# Patient Record
Sex: MALE | Race: WHITE | NOT HISPANIC OR LATINO | Employment: FULL TIME
[De-identification: names, ages, dates, MRNs, and addresses within clinical notes are randomized per-mention and may not be internally consistent; named-entity substitution may affect disease eponyms.]

---

## 2018-07-28 ENCOUNTER — RECORDS - HEALTHEAST (OUTPATIENT)
Dept: ADMINISTRATIVE | Facility: OTHER | Age: 53
End: 2018-07-28

## 2018-10-09 ENCOUNTER — RECORDS - HEALTHEAST (OUTPATIENT)
Dept: LAB | Facility: CLINIC | Age: 53
End: 2018-10-09

## 2018-10-09 LAB
ALBUMIN SERPL-MCNC: 3.9 G/DL (ref 3.5–5)
ANION GAP SERPL CALCULATED.3IONS-SCNC: 9 MMOL/L (ref 5–18)
BUN SERPL-MCNC: 14 MG/DL (ref 8–22)
CALCIUM SERPL-MCNC: 9.7 MG/DL (ref 8.5–10.5)
CHLORIDE BLD-SCNC: 102 MMOL/L (ref 98–107)
CO2 SERPL-SCNC: 27 MMOL/L (ref 22–31)
CREAT SERPL-MCNC: 0.73 MG/DL (ref 0.7–1.3)
GFR SERPL CREATININE-BSD FRML MDRD: >60 ML/MIN/1.73M2
GLUCOSE BLD-MCNC: 76 MG/DL (ref 70–125)
PHOSPHATE SERPL-MCNC: 3.6 MG/DL (ref 2.5–4.5)
POTASSIUM BLD-SCNC: 4.8 MMOL/L (ref 3.5–5)
SODIUM SERPL-SCNC: 138 MMOL/L (ref 136–145)

## 2018-10-25 ENCOUNTER — RECORDS - HEALTHEAST (OUTPATIENT)
Dept: ADMINISTRATIVE | Facility: OTHER | Age: 53
End: 2018-10-25

## 2019-06-17 ENCOUNTER — AMBULATORY - HEALTHEAST (OUTPATIENT)
Dept: PHYSICAL MEDICINE AND REHAB | Facility: CLINIC | Age: 54
End: 2019-06-17

## 2019-06-17 DIAGNOSIS — S39.012D LUMBAR SPINE STRAIN, SUBSEQUENT ENCOUNTER: ICD-10-CM

## 2019-06-21 ENCOUNTER — HOSPITAL ENCOUNTER (OUTPATIENT)
Dept: PHYSICAL MEDICINE AND REHAB | Facility: CLINIC | Age: 54
Discharge: HOME | End: 2019-06-21
Attending: FAMILY MEDICINE

## 2019-06-21 DIAGNOSIS — G89.29 CHRONIC RIGHT-SIDED LOW BACK PAIN WITH RIGHT-SIDED SCIATICA: ICD-10-CM

## 2019-06-21 DIAGNOSIS — M79.18 RIGHT BUTTOCK PAIN: ICD-10-CM

## 2019-06-21 DIAGNOSIS — M54.41 CHRONIC RIGHT-SIDED LOW BACK PAIN WITH RIGHT-SIDED SCIATICA: ICD-10-CM

## 2019-06-21 RX ORDER — TADALAFIL 5 MG/1
5 TABLET ORAL DAILY PRN
Status: SHIPPED | COMMUNITY
Start: 2019-06-21

## 2019-07-01 ENCOUNTER — HOSPITAL ENCOUNTER (OUTPATIENT)
Dept: MRI IMAGING | Facility: HOSPITAL | Age: 54
Discharge: HOME | End: 2019-07-01

## 2019-07-01 ENCOUNTER — HOSPITAL ENCOUNTER (OUTPATIENT)
Dept: RADIOLOGY | Facility: HOSPITAL | Age: 54
Discharge: HOME | End: 2019-07-01

## 2019-07-01 DIAGNOSIS — M54.41 CHRONIC RIGHT-SIDED LOW BACK PAIN WITH RIGHT-SIDED SCIATICA: ICD-10-CM

## 2019-07-01 DIAGNOSIS — G89.29 CHRONIC RIGHT-SIDED LOW BACK PAIN WITH RIGHT-SIDED SCIATICA: ICD-10-CM

## 2019-07-01 DIAGNOSIS — M79.18 RIGHT BUTTOCK PAIN: ICD-10-CM

## 2019-07-03 ENCOUNTER — COMMUNICATION - HEALTHEAST (OUTPATIENT)
Dept: PHYSICAL MEDICINE AND REHAB | Facility: CLINIC | Age: 54
End: 2019-07-03

## 2019-07-12 ENCOUNTER — HOSPITAL ENCOUNTER (OUTPATIENT)
Dept: PHYSICAL MEDICINE AND REHAB | Facility: CLINIC | Age: 54
Discharge: HOME | End: 2019-07-12
Attending: NURSE PRACTITIONER

## 2019-07-12 DIAGNOSIS — M47.816 LUMBAR FACET ARTHROPATHY: ICD-10-CM

## 2019-07-12 DIAGNOSIS — M79.18 RIGHT BUTTOCK PAIN: ICD-10-CM

## 2019-07-12 DIAGNOSIS — G89.29 CHRONIC RIGHT-SIDED LOW BACK PAIN WITHOUT SCIATICA: ICD-10-CM

## 2019-07-12 DIAGNOSIS — M54.50 CHRONIC RIGHT-SIDED LOW BACK PAIN WITHOUT SCIATICA: ICD-10-CM

## 2019-07-12 DIAGNOSIS — M51.369 DDD (DEGENERATIVE DISC DISEASE), LUMBAR: ICD-10-CM

## 2019-07-24 ENCOUNTER — RECORDS - HEALTHEAST (OUTPATIENT)
Dept: ADMINISTRATIVE | Facility: OTHER | Age: 54
End: 2019-07-24

## 2019-07-25 ENCOUNTER — RECORDS - HEALTHEAST (OUTPATIENT)
Dept: RADIOLOGY | Facility: CLINIC | Age: 54
End: 2019-07-25

## 2019-08-02 ENCOUNTER — HOSPITAL ENCOUNTER (OUTPATIENT)
Dept: PHYSICAL MEDICINE AND REHAB | Facility: CLINIC | Age: 54
Discharge: HOME | End: 2019-08-02
Attending: PAIN MEDICINE

## 2019-08-02 DIAGNOSIS — G89.29 CHRONIC RIGHT-SIDED LOW BACK PAIN WITHOUT SCIATICA: ICD-10-CM

## 2019-08-02 DIAGNOSIS — M47.816 LUMBAR FACET ARTHROPATHY: ICD-10-CM

## 2019-08-02 DIAGNOSIS — M54.50 CHRONIC RIGHT-SIDED LOW BACK PAIN WITHOUT SCIATICA: ICD-10-CM

## 2019-08-02 DIAGNOSIS — M79.18 RIGHT BUTTOCK PAIN: ICD-10-CM

## 2019-08-26 ENCOUNTER — HOSPITAL ENCOUNTER (OUTPATIENT)
Dept: PHYSICAL MEDICINE AND REHAB | Facility: CLINIC | Age: 54
Discharge: HOME | End: 2019-08-26
Attending: NURSE PRACTITIONER

## 2019-08-26 DIAGNOSIS — M54.50 CHRONIC RIGHT-SIDED LOW BACK PAIN WITHOUT SCIATICA: ICD-10-CM

## 2019-08-26 DIAGNOSIS — G89.29 CHRONIC RIGHT-SIDED LOW BACK PAIN WITHOUT SCIATICA: ICD-10-CM

## 2019-08-26 DIAGNOSIS — M47.816 LUMBAR FACET ARTHROPATHY: ICD-10-CM

## 2019-08-26 DIAGNOSIS — M79.18 RIGHT BUTTOCK PAIN: ICD-10-CM

## 2019-08-26 DIAGNOSIS — M51.369 DDD (DEGENERATIVE DISC DISEASE), LUMBAR: ICD-10-CM

## 2019-11-11 ENCOUNTER — COMMUNICATION - HEALTHEAST (OUTPATIENT)
Dept: PHYSICAL MEDICINE AND REHAB | Facility: CLINIC | Age: 54
End: 2019-11-11

## 2019-11-12 ENCOUNTER — AMBULATORY - HEALTHEAST (OUTPATIENT)
Dept: PHYSICAL MEDICINE AND REHAB | Facility: CLINIC | Age: 54
End: 2019-11-12

## 2019-11-12 DIAGNOSIS — M53.3 SACROILIAC JOINT DYSFUNCTION: ICD-10-CM

## 2019-11-12 DIAGNOSIS — M79.18 RIGHT BUTTOCK PAIN: ICD-10-CM

## 2019-11-12 DIAGNOSIS — G89.29 CHRONIC RIGHT-SIDED LOW BACK PAIN WITHOUT SCIATICA: ICD-10-CM

## 2019-11-12 DIAGNOSIS — M53.3 SACROILIAC JOINT PAIN: ICD-10-CM

## 2019-11-12 DIAGNOSIS — M54.50 CHRONIC RIGHT-SIDED LOW BACK PAIN WITHOUT SCIATICA: ICD-10-CM

## 2019-11-22 ENCOUNTER — HOSPITAL ENCOUNTER (OUTPATIENT)
Dept: PHYSICAL MEDICINE AND REHAB | Facility: CLINIC | Age: 54
Discharge: HOME | End: 2019-11-22
Attending: PAIN MEDICINE

## 2019-11-22 DIAGNOSIS — M53.3 SACROILIAC JOINT DYSFUNCTION: ICD-10-CM

## 2019-11-22 DIAGNOSIS — G89.29 CHRONIC RIGHT-SIDED LOW BACK PAIN WITHOUT SCIATICA: ICD-10-CM

## 2019-11-22 DIAGNOSIS — M79.18 RIGHT BUTTOCK PAIN: ICD-10-CM

## 2019-11-22 DIAGNOSIS — M54.50 CHRONIC RIGHT-SIDED LOW BACK PAIN WITHOUT SCIATICA: ICD-10-CM

## 2019-11-22 DIAGNOSIS — M53.3 SACROILIAC JOINT PAIN: ICD-10-CM

## 2019-12-06 ENCOUNTER — HOSPITAL ENCOUNTER (OUTPATIENT)
Dept: PHYSICAL MEDICINE AND REHAB | Facility: CLINIC | Age: 54
Discharge: HOME | End: 2019-12-06
Attending: NURSE PRACTITIONER

## 2019-12-06 DIAGNOSIS — M54.50 CHRONIC RIGHT-SIDED LOW BACK PAIN WITHOUT SCIATICA: ICD-10-CM

## 2019-12-06 DIAGNOSIS — M53.3 SACROILIAC JOINT PAIN: ICD-10-CM

## 2019-12-06 DIAGNOSIS — M53.3 SACROILIAC JOINT DYSFUNCTION: ICD-10-CM

## 2019-12-06 DIAGNOSIS — M47.816 LUMBAR FACET ARTHROPATHY: ICD-10-CM

## 2019-12-06 DIAGNOSIS — M51.369 DDD (DEGENERATIVE DISC DISEASE), LUMBAR: ICD-10-CM

## 2019-12-06 DIAGNOSIS — M79.18 RIGHT BUTTOCK PAIN: ICD-10-CM

## 2019-12-06 DIAGNOSIS — G89.29 CHRONIC RIGHT-SIDED LOW BACK PAIN WITHOUT SCIATICA: ICD-10-CM

## 2019-12-20 ENCOUNTER — HOSPITAL ENCOUNTER (OUTPATIENT)
Dept: PHYSICAL MEDICINE AND REHAB | Facility: CLINIC | Age: 54
Discharge: HOME | End: 2019-12-20
Attending: PAIN MEDICINE

## 2019-12-20 DIAGNOSIS — M54.50 CHRONIC RIGHT-SIDED LOW BACK PAIN WITHOUT SCIATICA: ICD-10-CM

## 2019-12-20 DIAGNOSIS — M47.816 LUMBAR FACET ARTHROPATHY: ICD-10-CM

## 2019-12-20 DIAGNOSIS — G89.29 CHRONIC RIGHT-SIDED LOW BACK PAIN WITHOUT SCIATICA: ICD-10-CM

## 2020-01-02 ENCOUNTER — AMBULATORY - HEALTHEAST (OUTPATIENT)
Dept: PHYSICAL MEDICINE AND REHAB | Facility: CLINIC | Age: 55
End: 2020-01-02

## 2020-01-02 ENCOUNTER — COMMUNICATION - HEALTHEAST (OUTPATIENT)
Dept: PHYSICAL MEDICINE AND REHAB | Facility: CLINIC | Age: 55
End: 2020-01-02

## 2020-01-02 DIAGNOSIS — M47.816 LUMBAR FACET ARTHROPATHY: ICD-10-CM

## 2020-01-02 DIAGNOSIS — M54.50 CHRONIC RIGHT-SIDED LOW BACK PAIN WITHOUT SCIATICA: ICD-10-CM

## 2020-01-02 DIAGNOSIS — M51.369 DDD (DEGENERATIVE DISC DISEASE), LUMBAR: ICD-10-CM

## 2020-01-02 DIAGNOSIS — M79.18 RIGHT BUTTOCK PAIN: ICD-10-CM

## 2020-01-02 DIAGNOSIS — G89.29 CHRONIC RIGHT-SIDED LOW BACK PAIN WITHOUT SCIATICA: ICD-10-CM

## 2020-01-17 ENCOUNTER — AMBULATORY - HEALTHEAST (OUTPATIENT)
Dept: PHYSICAL MEDICINE AND REHAB | Facility: CLINIC | Age: 55
End: 2020-01-17

## 2020-01-17 DIAGNOSIS — M51.369 DDD (DEGENERATIVE DISC DISEASE), LUMBAR: ICD-10-CM

## 2020-01-17 DIAGNOSIS — M47.816 LUMBAR FACET ARTHROPATHY: ICD-10-CM

## 2020-01-17 DIAGNOSIS — M54.50 CHRONIC RIGHT-SIDED LOW BACK PAIN WITHOUT SCIATICA: ICD-10-CM

## 2020-01-17 DIAGNOSIS — G89.29 CHRONIC RIGHT-SIDED LOW BACK PAIN WITHOUT SCIATICA: ICD-10-CM

## 2020-01-22 ENCOUNTER — OFFICE VISIT - HEALTHEAST (OUTPATIENT)
Dept: PHYSICAL THERAPY | Facility: CLINIC | Age: 55
End: 2020-01-22

## 2020-01-22 DIAGNOSIS — M54.50 CHRONIC RIGHT-SIDED LOW BACK PAIN WITHOUT SCIATICA: ICD-10-CM

## 2020-01-22 DIAGNOSIS — G89.29 CHRONIC RIGHT-SIDED LOW BACK PAIN WITHOUT SCIATICA: ICD-10-CM

## 2020-01-24 ENCOUNTER — OFFICE VISIT - HEALTHEAST (OUTPATIENT)
Dept: PHYSICAL THERAPY | Facility: CLINIC | Age: 55
End: 2020-01-24

## 2020-01-24 DIAGNOSIS — G89.29 CHRONIC RIGHT-SIDED LOW BACK PAIN WITHOUT SCIATICA: ICD-10-CM

## 2020-01-24 DIAGNOSIS — M54.50 CHRONIC RIGHT-SIDED LOW BACK PAIN WITHOUT SCIATICA: ICD-10-CM

## 2020-01-27 ENCOUNTER — OFFICE VISIT - HEALTHEAST (OUTPATIENT)
Dept: PHYSICAL THERAPY | Facility: CLINIC | Age: 55
End: 2020-01-27

## 2020-01-27 DIAGNOSIS — M54.50 CHRONIC RIGHT-SIDED LOW BACK PAIN WITHOUT SCIATICA: ICD-10-CM

## 2020-01-27 DIAGNOSIS — G89.29 CHRONIC RIGHT-SIDED LOW BACK PAIN WITHOUT SCIATICA: ICD-10-CM

## 2020-01-29 ENCOUNTER — OFFICE VISIT - HEALTHEAST (OUTPATIENT)
Dept: PHYSICAL THERAPY | Facility: CLINIC | Age: 55
End: 2020-01-29

## 2020-01-29 DIAGNOSIS — M54.50 CHRONIC RIGHT-SIDED LOW BACK PAIN WITHOUT SCIATICA: ICD-10-CM

## 2020-01-29 DIAGNOSIS — G89.29 CHRONIC RIGHT-SIDED LOW BACK PAIN WITHOUT SCIATICA: ICD-10-CM

## 2020-02-07 ENCOUNTER — HOSPITAL ENCOUNTER (OUTPATIENT)
Dept: PHYSICAL MEDICINE AND REHAB | Facility: CLINIC | Age: 55
Discharge: HOME | End: 2020-02-07
Attending: PAIN MEDICINE

## 2020-02-07 DIAGNOSIS — G89.29 CHRONIC RIGHT-SIDED LOW BACK PAIN WITHOUT SCIATICA: ICD-10-CM

## 2020-02-07 DIAGNOSIS — M47.816 LUMBAR FACET ARTHROPATHY: ICD-10-CM

## 2020-02-07 DIAGNOSIS — M51.369 DDD (DEGENERATIVE DISC DISEASE), LUMBAR: ICD-10-CM

## 2020-02-07 DIAGNOSIS — M54.50 CHRONIC RIGHT-SIDED LOW BACK PAIN WITHOUT SCIATICA: ICD-10-CM

## 2020-02-07 RX ORDER — MULTIPLE VITAMINS W/ MINERALS TAB 9MG-400MCG
1 TAB ORAL DAILY
Status: SHIPPED | COMMUNITY
Start: 2020-02-07

## 2020-02-14 ENCOUNTER — AMBULATORY - HEALTHEAST (OUTPATIENT)
Dept: PHYSICAL MEDICINE AND REHAB | Facility: CLINIC | Age: 55
End: 2020-02-14

## 2020-02-14 DIAGNOSIS — G89.29 CHRONIC RIGHT-SIDED LOW BACK PAIN WITHOUT SCIATICA: ICD-10-CM

## 2020-02-14 DIAGNOSIS — M54.50 CHRONIC RIGHT-SIDED LOW BACK PAIN WITHOUT SCIATICA: ICD-10-CM

## 2020-02-14 DIAGNOSIS — M51.369 DDD (DEGENERATIVE DISC DISEASE), LUMBAR: ICD-10-CM

## 2020-02-14 DIAGNOSIS — M47.816 LUMBAR FACET ARTHROPATHY: ICD-10-CM

## 2020-03-04 ENCOUNTER — HOSPITAL ENCOUNTER (OUTPATIENT)
Dept: PHYSICAL MEDICINE AND REHAB | Facility: CLINIC | Age: 55
Discharge: HOME | End: 2020-03-04
Attending: PAIN MEDICINE

## 2020-03-04 DIAGNOSIS — M47.816 LUMBAR FACET ARTHROPATHY: ICD-10-CM

## 2020-03-04 DIAGNOSIS — M54.50 CHRONIC RIGHT-SIDED LOW BACK PAIN WITHOUT SCIATICA: ICD-10-CM

## 2020-03-04 DIAGNOSIS — M51.369 DDD (DEGENERATIVE DISC DISEASE), LUMBAR: ICD-10-CM

## 2020-03-04 DIAGNOSIS — G89.29 CHRONIC RIGHT-SIDED LOW BACK PAIN WITHOUT SCIATICA: ICD-10-CM

## 2020-03-20 ENCOUNTER — HOSPITAL ENCOUNTER (OUTPATIENT)
Dept: PHYSICAL MEDICINE AND REHAB | Facility: CLINIC | Age: 55
Discharge: HOME | End: 2020-03-20
Attending: NURSE PRACTITIONER

## 2020-03-20 DIAGNOSIS — M53.3 SACROILIAC JOINT PAIN: ICD-10-CM

## 2020-03-20 DIAGNOSIS — M53.3 SACROILIAC JOINT DYSFUNCTION: ICD-10-CM

## 2020-03-20 DIAGNOSIS — M47.816 LUMBAR FACET ARTHROPATHY: ICD-10-CM

## 2020-03-20 DIAGNOSIS — M51.369 DDD (DEGENERATIVE DISC DISEASE), LUMBAR: ICD-10-CM

## 2020-03-20 DIAGNOSIS — G89.29 CHRONIC RIGHT-SIDED LOW BACK PAIN WITHOUT SCIATICA: ICD-10-CM

## 2020-03-20 DIAGNOSIS — M79.18 RIGHT BUTTOCK PAIN: ICD-10-CM

## 2020-03-20 DIAGNOSIS — M54.50 CHRONIC RIGHT-SIDED LOW BACK PAIN WITHOUT SCIATICA: ICD-10-CM

## 2021-01-12 ENCOUNTER — HOSPITAL ENCOUNTER (OUTPATIENT)
Dept: PHYSICAL MEDICINE AND REHAB | Facility: CLINIC | Age: 56
Discharge: HOME | End: 2021-01-12
Attending: NURSE PRACTITIONER

## 2021-01-12 DIAGNOSIS — M47.816 LUMBAR FACET ARTHROPATHY: ICD-10-CM

## 2021-01-12 DIAGNOSIS — M54.50 CHRONIC RIGHT-SIDED LOW BACK PAIN WITHOUT SCIATICA: ICD-10-CM

## 2021-01-12 DIAGNOSIS — G89.29 CHRONIC RIGHT-SIDED LOW BACK PAIN WITHOUT SCIATICA: ICD-10-CM

## 2021-01-12 DIAGNOSIS — M51.369 DDD (DEGENERATIVE DISC DISEASE), LUMBAR: ICD-10-CM

## 2021-01-12 RX ORDER — MELOXICAM 7.5 MG/1
7.5 TABLET ORAL 2 TIMES DAILY PRN
Qty: 30 TABLET | Refills: 2 | Status: SHIPPED | OUTPATIENT
Start: 2021-01-12

## 2021-03-10 ENCOUNTER — HOSPITAL ENCOUNTER (OUTPATIENT)
Dept: PHYSICAL MEDICINE AND REHAB | Facility: CLINIC | Age: 56
Discharge: HOME | End: 2021-03-10
Attending: PAIN MEDICINE

## 2021-03-10 DIAGNOSIS — M99.02 THORACIC REGION SOMATIC DYSFUNCTION: ICD-10-CM

## 2021-03-10 DIAGNOSIS — M51.369 DDD (DEGENERATIVE DISC DISEASE), LUMBAR: ICD-10-CM

## 2021-03-10 DIAGNOSIS — M47.817 LUMBOSACRAL SPONDYLOSIS WITHOUT MYELOPATHY: ICD-10-CM

## 2021-03-10 DIAGNOSIS — M99.04 SOMATIC DYSFUNCTION OF SACRAL REGION: ICD-10-CM

## 2021-03-10 DIAGNOSIS — M53.3 SACROILIAC JOINT DYSFUNCTION: ICD-10-CM

## 2021-03-10 DIAGNOSIS — M99.03 SOMATIC DYSFUNCTION OF LUMBAR REGION: ICD-10-CM

## 2021-03-10 DIAGNOSIS — M79.18 MYOFASCIAL PAIN: ICD-10-CM

## 2021-03-10 DIAGNOSIS — M99.05 SOMATIC DYSFUNCTION OF PELVIC REGION: ICD-10-CM

## 2021-03-24 ENCOUNTER — HOSPITAL ENCOUNTER (OUTPATIENT)
Dept: PHYSICAL MEDICINE AND REHAB | Facility: CLINIC | Age: 56
Discharge: HOME | End: 2021-03-24
Attending: PAIN MEDICINE

## 2021-03-24 DIAGNOSIS — M79.18 MYOFASCIAL PAIN: ICD-10-CM

## 2021-03-24 DIAGNOSIS — M99.05 SOMATIC DYSFUNCTION OF PELVIC REGION: ICD-10-CM

## 2021-03-24 DIAGNOSIS — M99.03 SOMATIC DYSFUNCTION OF LUMBAR REGION: ICD-10-CM

## 2021-03-24 DIAGNOSIS — M99.04 SOMATIC DYSFUNCTION OF SACRAL REGION: ICD-10-CM

## 2021-03-24 DIAGNOSIS — M47.817 LUMBOSACRAL SPONDYLOSIS WITHOUT MYELOPATHY: ICD-10-CM

## 2021-03-24 RX ORDER — DIAZEPAM 5 MG
TABLET ORAL
Qty: 2 TABLET | Refills: 0 | Status: SHIPPED | OUTPATIENT
Start: 2021-03-24

## 2021-03-25 ENCOUNTER — COMMUNICATION - HEALTHEAST (OUTPATIENT)
Dept: PHYSICAL MEDICINE AND REHAB | Facility: CLINIC | Age: 56
End: 2021-03-25

## 2021-04-05 ENCOUNTER — HOSPITAL ENCOUNTER (OUTPATIENT)
Dept: PHYSICAL MEDICINE AND REHAB | Facility: CLINIC | Age: 56
Discharge: HOME | End: 2021-04-05
Attending: PAIN MEDICINE

## 2021-04-05 DIAGNOSIS — M47.817 LUMBOSACRAL SPONDYLOSIS WITHOUT MYELOPATHY: ICD-10-CM

## 2021-04-30 ENCOUNTER — HOSPITAL ENCOUNTER (OUTPATIENT)
Dept: PHYSICAL MEDICINE AND REHAB | Facility: CLINIC | Age: 56
Discharge: HOME | End: 2021-04-30
Attending: NURSE PRACTITIONER

## 2021-04-30 DIAGNOSIS — M53.3 SACROILIAC JOINT PAIN: ICD-10-CM

## 2021-04-30 DIAGNOSIS — M47.816 LUMBAR FACET ARTHROPATHY: ICD-10-CM

## 2021-04-30 DIAGNOSIS — G89.29 CHRONIC BILATERAL LOW BACK PAIN WITHOUT SCIATICA: ICD-10-CM

## 2021-04-30 DIAGNOSIS — M54.50 CHRONIC BILATERAL LOW BACK PAIN WITHOUT SCIATICA: ICD-10-CM
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
## 2021-05-29 NOTE — PATIENT INSTRUCTIONS - HE
Detail Level: Detailed
[facility] Radiology Locations    Please call [phone] to schedule your image(s) (select option #1 and then #2). There are 3 different locations, see below. You can do walk-in visits for xray only images if you want.     [facility]  [address] [place] [id number]    [facility]   [address] [address]    [place] [id number] [facility] [id number]    Discussed the importance of core strengthening, ROM, stretching exercises with the patient and how each of these entities is important in decreasing pain.  Explained to the patient that the purpose of physical therapy is to teach the patient a home exercise program.  These exercises need to be performed every day in order to decrease pain and prevent future occurrences of pain.        ~Please call Nurse Navigation line [phone] with any questions or concerns about your treatment plan, if symptoms worsen and you would like to be seen urgently, or if you have problems controlling bladder and bowel function.  ~Follow Up Appointment time slots with Angela Segovia CNP with the [facility], are also available at the [facility] location near [facility] on the first and third THURSDAY afternoons of each month.      
Quality 110: Preventive Care And Screening: Influenza Immunization: Influenza Immunization previously received during influenza season

## 2021-05-30 NOTE — TELEPHONE ENCOUNTER
It would be best to see him in clinic as I would like to do another exam to evaluate if injections would be an option.

## 2021-05-30 NOTE — TELEPHONE ENCOUNTER
Repeating the exam may be helpful as well to identify what we can do going forward now that we have the MRI pictures. There are findings on his MRI that could be a source of pain, however no severe findings. Therefore I think it would be best to discuss in person that way we can review his MRI in specifics after a physical exam, and answer all questions at that time.

## 2021-05-30 NOTE — PATIENT INSTRUCTIONS - HE
Discussed the importance of core strengthening, ROM, stretching exercises with the patient and how each of these entities is important in decreasing pain.  Explained to the patient that the purpose of physical therapy is to teach the patient a home exercise program.  These exercises need to be performed every day in order to decrease pain and prevent future occurrences of pain.        ~Please call Nurse Navigation line [phone] with any questions or concerns about your treatment plan, if symptoms worsen and you would like to be seen urgently, or if you have problems controlling bladder and bowel function.  ~Follow Up Appointment time slots with Angela Segovia CNP with the [facility], are also available at the [facility] location near [facility] on the first and third THURSDAY afternoons of each month.

## 2021-05-30 NOTE — TELEPHONE ENCOUNTER
----- Message from Angela Segovia CNP sent at 7/3/2019  7:41 AM CDT -----  Please call patient and notify him that his lumbar spine MRI shows lumbar scoliosis/curvature and there is mild facet arthropathy multiple levels with mild disc height loss at L2-3, L3-4 on advanced at L4-5.  There is very mild nerve compression at L3-4 and L4-5 bilaterally but no high-grade nerve compression.  No concerning findings and no clear findings as a result of injury.  I would recommend patient continue physical therapy and follows up in clinic if you would like to consider injection to reassess.

## 2021-05-30 NOTE — TELEPHONE ENCOUNTER
Pt returned call. Provider's response given. Pt verbalized understanding. Pt is scheduled to see provider on 7/12 in follow-up.
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
## 2021-05-31 NOTE — PATIENT INSTRUCTIONS - HE
Could consider right sacroiliac joint steroid injection at any time if you would like to try and target right low back and buttock pain.    Discussed the importance of core strengthening, ROM, stretching exercises with the patient and how each of these entities is important in decreasing pain.  Explained to the patient that the purpose of physical therapy is to teach the patient a home exercise program.  These exercises need to be performed every day in order to decrease pain and prevent future occurrences of pain.        ~Please call Nurse Navigation line [phone] with any questions or concerns about your treatment plan, if symptoms worsen and you would like to be seen urgently, or if you have problems controlling bladder and bowel function.  ~Follow Up Appointment time slots with Angela Segovia CNP with the [facility], are also available at the [facility] location near [facility] on the first and third THURSDAY afternoons of each month.

## 2021-05-31 NOTE — PATIENT INSTRUCTIONS - HE
DISCHARGE INSTRUCTIONS    During office hours (8:00 a.m.- 4:30 p.m.) questions or concerns may be answered  by calling Spine Navigation Nurses at  [phone].     If you experience any problems after hours  please call [phone] and you will be connected to [facility].     All Patients:    ? You may experience an increase in your symptoms for the first 2 days (It may take anywhere between 2 days- 2 weeks for the steroid to have maximum effect).    ? You may use ice on the injection site, as frequently as 20 minutes each hour if needed.    ? You may take your pain medicine.    ? You may continue taking your regular medication after your injection. If you have had a Medial Branch Block you may resume pain medication once your pain diary is completed.    ? You may shower. No swimming, tub bath or hot tub for 48 hours.  You may remove your bandaid/bandage as soon as you are home.    ? You may resume light activities, as tolerated.    ? Resume your usual diet as tolerated.    ? It is strongly advised that you do not drive for 1-3 hours post injection.    ? If you have had oral sedation:  Do not drive for 8 hours post injection.      ? If you have had IV sedation:  Do not drive for 24 hours post injection.  Do not operate hazardous machinery or make important personal/business decisions for 24 hours.      POSSIBLE STEROID SIDE EFFECTS (If steroid/cortisone was used for your procedure)    -If you experience these symptoms, it should only last for a short period      Swelling of the legs                Skin redness (flushing)       Mouth (oral) irritation     Blood sugar (glucose) levels              Sweats                      Mood changes    Headache    Sleeplessness         POSSIBLE PROCEDURE SIDE EFFECTS  -Call the Spine Center if you are concerned    Increased Pain             Increased numbness/tingling        Nausea/Vomiting            Bruising/bleeding at site        Redness or swelling                                                 Difficulty walking        Weakness             Fever greater than 100.5    *In the event of a severe headache after an epidural steroid injection that is relieved by lying down, please call the [facility] to speak with a clinical staff member*

## 2021-06-03 VITALS — WEIGHT: 161 LBS

## 2021-06-03 VITALS — WEIGHT: 165 LBS

## 2021-06-03 VITALS — WEIGHT: 164 LBS

## 2021-06-03 NOTE — TELEPHONE ENCOUNTER
"Patient calling as he is wondering about the \"other injection she talked about when I was in a month or so ago.\" Chart reviewed. Patient last seen 8/26 after a right L5-S1 Facet joint injection. His pain in right low back continues. \"Constant annoying pain in the low back and kind of goes down a bit.\" When trying to clarify where it goes with patient, it is into the right buttock. Rates pain 6/10 at worst and 3/10 at best. Right SI joint injection had been discussed at last appointment. Need to see him as it has been 2 and 1/2 months or can he be scheduled for the SI joint injection? Please advise.  "

## 2021-06-03 NOTE — TELEPHONE ENCOUNTER
Pt returned call. Provider's results and recommendations given. Pt stated understanding and would like to proceed. Injection requirements reviewed. Pt hung up upon transferring to .  verbalized they will call pt to schedule.

## 2021-06-03 NOTE — TELEPHONE ENCOUNTER
Reasonable to move forward with right SI joint steroid injection as long as his pain is localizing to the right low back and upper buttock, if his pain has changed since the last he was in clinic than I would like to see him in clinic for reevaluation.

## 2021-06-03 NOTE — PATIENT INSTRUCTIONS - HE
Follow-up visit with Angela Segovia in 2 weeks to discuss injection outcome and determine care plan going forward.       DISCHARGE INSTRUCTIONS    During office hours (8:00 a.m.- 4:00 p.m.) questions or concerns may be answered  by calling [facility] Navigation Nurses at  [phone].  Messages received after hours will be returned the following business day.      In the case of an emergency, please dial 911 or seek assistance at the nearest Emergency Room/Urgent Care facility.     All Patients:    ? You may experience an increase in your symptoms for the first 2 days (It may take anywhere between 2 days- 2 weeks for the steroid to have maximum effect).    ? You may use ice on the injection site, as frequently as 20 minutes each hour if needed.    ? You may take your pain medicine.    ? You may continue taking your regular medication after your injection. If you have had a Medial Branch Block you may resume pain medication once your pain diary is completed.    ? You may shower. No swimming, tub bath or hot tub for 48 hours.  You may remove your bandaid/bandage as soon as you are home.    ? You may resume light activities, as tolerated.    ? Resume your usual diet as tolerated.    ? It is strongly advised that you do not drive for 1-3 hours post injection.    ? If you have had oral sedation:  Do not drive for 8 hours post injection.      ? If you have had IV sedation:  Do not drive for 24 hours post injection.  Do not operate hazardous machinery or make important personal/business decisions for 24 hours.      POSSIBLE STEROID SIDE EFFECTS (If steroid/cortisone was used for your procedure)    -If you experience these symptoms, it should only last for a short period      Swelling of the legs                Skin redness (flushing)       Mouth (oral) irritation     Blood sugar (glucose) levels              Sweats                      Mood changes    Headache    Sleeplessness         POSSIBLE PROCEDURE SIDE  EFFECTS  -Call the Sp[facility] Center if you are concerned    Increased Pain             Increased numbness/tingling        Nausea/Vomiting            Bruising/bleeding at site        Redness or swelling                                                Difficulty walking        Weakness             Fever greater than 100.5    *In the event of a severe headache after an epidural steroid injection that is relieved by lying down, please call the [facility] to speak with a clinical staff member*

## 2021-06-04 VITALS — WEIGHT: 161 LBS

## 2021-06-04 NOTE — TELEPHONE ENCOUNTER
"Patient calling to speak to [facility] regarding what he is to be doing. States he got a call to schedule PT. \"I've done it in the past and it doesn't do anything.\" Chart reviewed. Per message from [facility]: \"Patient did have a positive response therefore is a candidate for moving forward with second medial branch block for moving forward towards RFA.  However as discussed in our last visit he would have to repeat physical therapy prior to moving forward with radiofrequency ablation.  Therefore I did place an order for physical therapy at this time to establish home exercises for long-term spine health, this is a requirement for insurance to cover radiofrequency ablation.  He would need to complete 4 physical therapy sessions before considering an ablation.\"    This information shared with him. He is wondering if he can have the second set of shots (MBBs) and then PT or has to have PT first and then injection. Explained provider would be made aware of questions and then called with how she would like him to proceed. Stated understanding. Per patient, OK to leave a message.    "
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
## 2021-06-04 NOTE — TELEPHONE ENCOUNTER
We can do the second set of medial branch block however no matter what before he would do the radiofrequency ablation he would have to complete 4 sessions of physical therapy therefore I would highly recommend him starting physical therapy as soon as possible or we will still have to wait.  The medial branch block is only a diagnostic test and will not give him any lasting benefit which is why we recommend starting with PT prior to second MBB.

## 2021-06-04 NOTE — PATIENT INSTRUCTIONS - HE

## 2021-06-04 NOTE — PATIENT INSTRUCTIONS - HE
Please complete your pain diary and return the diary to the Spine Center at your earliest convenience.  The [facility] Center will contact you to schedule your next appointment after your pain diary is reviewed by your doctor.  Thank you.    DISCHARGE INSTRUCTIONS    During office hours (8:00 a.m.- 4:30 p.m.) questions or concerns may be answered  by calling  [phone] or spine navigation [phone]. If you experience any problems after hours  please call [phone] and you will be connected to [facility]     All Patients:  ? You may experience an increase in your symptoms for the first 2 days, once the numbing medication wears off.    ? You may resume your regular medication, no pain medication until you have completed your diary    ? You may shower. No swimming, tub bath or hot tub for 24 hours; remove bandage after 4 hours    ? Continue your activities that can cause you pain to test the blocks.                         ? You should not drive for the next 3 to 5 hours (have someone drive you)           POSSIBLE PROCEDURE SIDE EFFECTS  -Call Spine Center if concerned-    Increased Pain  Increased numbness/tingling     Nausea/Vomiting  Bruising/bleeding at site (hematoma)             Swelling at site (edema) Headache  Difficulty walking  Infection        Fever greater than 100.5

## 2021-06-05 NOTE — PATIENT INSTRUCTIONS - HE
Please complete your pain diary and return the diary to the Spine Center at your earliest convenience.  The Sp[facility] Center will contact you to schedule your next appointment after your pain diary is reviewed by your doctor.  Thank you.    DISCHARGE INSTRUCTIONS    During office hours (8:00 a.m.- 4:00 p.m.) questions or concerns may be answered  by calling Spine Center Navigation Nurses at  [phone].  Messages received after hours will be returned the following business day.      In the case of an emergency, please dial 911 or seek assistance at the nearest Emergency Room/Urgent Care facility.     All Patients:  ? You may experience an increase in your symptoms for the first 2 days, once the numbing medication wears off.    ? You may resume your regular medication, no pain medication until you have completed your diary    ? You may shower. No swimming, tub bath or hot tub for 24 hours; remove bandage after 4 hours    ? Continue your activities that can cause you pain to test the blocks.                         ? You should not drive for the next 3 to 5 hours (have someone drive you)           POSSIBLE PROCEDURE SIDE EFFECTS  -Call Spine Center if concerned-    Increased Pain  Increased numbness/tingling     Nausea/Vomiting  Bruising/bleeding at site (hematoma)             Swelling at site (edema) Headache  Difficulty walking  Infection        Fever greater than 100.5

## 2021-06-05 NOTE — TELEPHONE ENCOUNTER
Pt returned call. He is scheduled for his 1st PT session on 1/22/2020. Encouraged him to schedule at least 3 more sessions after his eval. He stated understanding.     Pt inquiring about 2nd set of MBBs. He wonders if he needs to complete 4 sessions of PT prior to the MBBs or if he can schedule the MBBs while he is doing 4 sessions of PT. Will check with provider to see if there is a preference.     Ok to leave a detailed message upon call back.

## 2021-06-05 NOTE — TELEPHONE ENCOUNTER
Call to pt with provider's response. Left detailed message for pt to return call if he would like to schedule 2nd set of MBBs.  Order in place. Injection requirements will need to be reviewed with him.
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
## 2021-06-05 NOTE — PROGRESS NOTES
"[facility] Discharge Summary  Patient Name: Jose Manuel Barron Jr.  Date: 4/13/2020  Referral Diagnosis:   Chronic right-sided low back pain without sciatica   Referring provider: Angela Segovia C*Visit Diagnosis:   1. Chronic right-sided low back pain without sciatica         Goals:  Pt. will demonstrate/verbalize independence in self-management of condition in : 4 weeks  Pt. will be independent with home exercise program in : 4 weeks    Pt will: have SLR phoenix >60 degrees without pain in 4 weeks  Pt will: demonstrates strength per MedX within norms or increased by 30# average in 4 weeks      Patient was seen for 4 visits from 1/22/20 to 1/29/10 with 0 missed appointments.  Pt was feeling increased soreness after exercises and feeling as if this did not \"fix\" his problem.  Pt discontinued on his own against my advice.    Therapy will be discontinued at this time.  The patient will need a new referral to resume.    Thank you for your referral.  Zuleika Chamberlain, DPT  4/13/2020  1:05 PM      [facility] Rehabilitation Daily Progress     Patient Name: Jose Manuel Barron Jr.  Date: 1/29/2020  Date of Initial Evaluation: 1/22/2020  Visit #: 4/12  PTA visit #:  -  Referral Diagnosis:   Chronic right-sided low back pain without sciatica   Referring provider: Angela Segovia C*  Visit Diagnosis:     ICD-10-CM    1. Chronic right-sided low back pain without sciatica M54.5     G89.29        Assessment:       Patient somewhat resistive to exercise, does report feeling more sore after exercises. Did discuss with patient that often with exercise, soreness is a normal and good response. He feels that in the past it has not \"fixed\" the issues, which the patient is focused on. He is schedule to get a medial branch block. Will benefit from continued education on the benefits of exercise.     Pt has reported that he got a prescription to help him quit smoking.  He also reported today that he will likely " "discontinue PT at this point and continue on his own.  I advised him that coming in to use the MedX equipment will likely help him, but he declined at this time.    Goal Status:  Pt. will demonstrate/verbalize independence in self-management of condition in : 4 weeks  Pt. will be independent with home exercise program in : 4 weeks    Pt will: have SLR hpoenix >60 degrees without pain in 4 weeks  Pt will: demonstrates strength per MedX within norms or increased by 30# average in 4 weeks      Plan / Patient Education:     Pt would like to discontinue therapy at this time.  I will hold his chart 30 days as I advised him against this.      Subjective:     Pain Ratin/10    Pt reports he is remodeling a bedroom so he has been doing a lot of lifting/moving/carrying, doing painting and carpet.    Social information:      Objective:     Good abdominal set.    Lumbar MEDX:  Enter Week / Visit #:   Weight (lbs): 91#  Reps (#): 23  Time: 114  ROM (degrees): 0-36    Exercises:  Exercise #1: Treadmill warmup 1.5 mph 4'  Comment #1: Rotary torso 30# started to the left 90 seconds  Exercise #2: Cat/cow stretch 10x   Comment #2: Seated hamstring stretch 45\" phoenix  Exercise #3: LTR X 10  Comment #3: Piriformis Stretch X 30 seconds  Exercise #4: gilbert pose, not added to HEP  Comment #4: 90/90 abdominal march X 10   Exercise #6: Bridge X 10  Comment #6: TA 90/90 march X 10   Exercise #7: reformer leg press X 10 DL       Treatment Today     TREATMENT MINUTES COMMENTS   Evaluation     Self-care/ Home management     Manual therapy     Neuromuscular Re-education     Therapeutic Activity     Therapeutic Exercises 26 See flowsheets   Gait training     Modality__________________                Total 26    Blank areas are intentional and mean the treatment did not include these items.       Zuleika Chamberlain, PT  2020  "
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
## 2021-06-05 NOTE — TELEPHONE ENCOUNTER
He is okay to at least schedule his second medial branch block but we will not be able to even place an order for the ablation until he has completed 4 PT sessions.

## 2021-06-07 NOTE — PATIENT INSTRUCTIONS - HE
Discussed the importance of core strengthening, ROM, stretching exercises with the patient and how each of these entities is important in decreasing pain.  Explained to the patient that the purpose of physical therapy is to teach the patient a home exercise program.  These exercises need to be performed every day in order to decrease pain and prevent future occurrences of pain.        ~Please call Nurse Navigation line [phone] with any questions or concerns about your treatment plan, if symptoms worsen and you would like to be seen urgently, or if you have problems controlling bladder and bowel function.  ~Follow Up Appointment time slots with Angela Segovia CNP with [facility], are also available at the [facility] location near [facility] on the first and third THURSDAY afternoons of each month.
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
## 2021-06-15 NOTE — PROGRESS NOTES
Assessment:     Diagnoses and all orders for this visit:    Lumbosacral spondylosis without myelopathy    Myofascial pain    Sacroiliac joint dysfunction    DDD (degenerative disc disease), lumbar    Somatic dysfunction of sacral region    Somatic dysfunction of lumbar region    Somatic dysfunction of pelvic region    Thoracic region somatic dysfunction       Jose Manuel Barron Jr. is a 55 y.o. y.o. male with past medical history significant for tobacco dependence, erectile dysfunction, left hip replacement 10/2018 who presents today for follow-up regarding low back pain:    -Overall patient's physical exam is reassuring he has normal strength in his lower extremities.  His pain is likely secondary to facet arthropathy.     Plan:     A shared decision making plan was used. The patient's values and choices were respected. Prior medical records from Angela Segovia's last note on 1/12/2021 were reviewed today. The following represents what was discussed and decided upon by the provider and the patient.        -DIAGNOSTIC TESTS: Images were personally reviewed and interpreted.   --Lumbar spine MRI 7/1/2019 shows posterior disc bulge at L3-4 and L4-5 with minimal bilateral foraminal stenosis.  Disc bulge at L5-S1 with no foraminal stenosis.  Disc height loss most advanced at L4-5.  Right convex curvature thoracolumbar junction.  Multilevel facet hypertrophy with arthritis most advanced on the right at L5-S1.    -INTERVENTIONS: Osteopathic ambulation is performed today.  Please see the procedure note below.    -MEDICATIONS: No changes to medications.  -  Discussed side effects of medications and proper use. Patient verbalized understanding.    -PHYSICAL THERAPY: Recommended he do his physical therapy exercises on a daily basis.  Discussed the importance of core strengthening, ROM, stretching exercises with the patient and how each of these entities is important in decreasing pain.  Explained to the patient that the  purpose of physical therapy is to teach the patient a home exercise program.  These exercises need to be performed every day in order to decrease pain and prevent future occurrences of pain.        -PATIENT EDUCATION: We discussed pain management and fashion including physical therapy, medication management, osteopathic manipulation.  We also discussed possibilities of right L4 and L5 medial branch radiofrequency ablation.    -FOLLOW UP: Patient will follow up in 2 weeks for OT.  Advised to contact clinic if symptoms worsen or change.    Subjective:     Jose Manuel Barron Jr. is a 55 y.o. male who presents today for follow-up regarding right-sided low back pain.  Patient reports that he had a time also very good relief from the radiofrequency ablation, however thereafter pain is slowly progressively returned.  Pain is in his right low back and buttock area where it previously was.  His pain today is 2/10 is worst is 8/10 as best as 1/10.  Pain is  worse with being active and improved with doing nothing and relaxing.  He denies any bowel or bladder changes, fevers, chills, unintentional weight loss.  He is currently not taking anything for pain.  He would like to have a treatment of osteopathic manipulation.  He notes that procedure although it did help quite a bit for a long time it was painful.    -Treatment to Date: No prior spinal surgery or spinal injection.  Physical therapy [facility] for neck and back pain x32 sessions 3/3/2018 through 12/26/2018 with minimal benefit.  Physical therapy x4 sessions optimum 1/29/2020 chronic right LBP.     Right L5-S1 facet joint steroid injection 8/2/2019 with some initial, minimal lasting benefit.  Preprocedure pain 3/10, post 1/10.  Right SI joint steroid injection 11/22/2019 with relief x2 days, 10% lasting relief.  Right L4, L5 MBB 12/20/2019 and 2/7/2020.  Right L4, L5 radiofrequency ablation 3/4/2020 with 90% relief chronic right LBP, relief x8 months.  Preprocedure pain 3/10,  post 0/10.         There is no problem list on file for this patient.      Current Outpatient Medications on File Prior to Encounter   Medication Sig Dispense Refill     multivitamin with minerals (THERA-M) 9 mg iron-400 mcg Tab tablet Take 1 tablet by mouth daily.       tadalafil (CIALIS) 5 MG tablet Take 5 mg by mouth daily as needed for erectile dysfunction.       meloxicam (MOBIC) 7.5 MG tablet Take 1 tablet (7.5 mg total) by mouth 2 (two) times a day as needed. 30 tablet 2     No current facility-administered medications on file prior to encounter.        No Known Allergies    No past medical history on file.     Review of Systems  ROS:  Specifically negative for bowel/bladder dysfunction, balance changes, headache, dizziness, foot drop, fevers, chills, appetite changes, nausea/vomiting, unexplained weight loss. Otherwise 13 systems reviewed are negative. Please see the patient's intake questionnaire from today for details.    Reviewed Social, Family, Past Medical and Past Surgical history with patient, no significant changes noted since prior visit.     Objective:     /78 (Patient Site: Right Arm, Patient Position: Sitting, Cuff Size: Adult Large)   Pulse 88   Temp 99.2  F (37.3  C) (Oral)   SpO2 98%     PHYSICAL EXAMINATION:    --CONSTITUTIONAL: Well developed, well nourished, healthy appearing individual.  --PSYCHIATRIC: Appropriate mood and affect. No difficulty interacting due to temper, social withdrawal, or memory issues.  --SKIN: Lumbar region is dry and intact.   --RESPIRATORY: Normal rhythm and effort. No abnormal accessory muscle breathing patterns noted.   --MUSCULOSKELETAL:  Normal lumbar lordosis noted, no lateral shift.  --GROSS MOTOR: Easily arises from a seated position. Gait is non-antalgic  --LUMBAR SPINE:  Inspection reveals no evidence of deformity. Range of motion is moderately limited in lumbar flexion, extension, lateral rotation.  There is palpation of the low back on the right  side. Straight leg raising in the seated position is negative to radicular pain.  Facet loading is positive on the right.  --SACROILIAC JOINT: *One Finger point test positive on the right.  --LOWER EXTREMITY MOTOR TESTING:  Plantar flexion left 5/5, right 5/5   Dorsiflexion left 5/5, right 5/5   Great toe MTP extension left 5/5, right 5/5  Knee flexion left 5/5, right 5/5  Knee extension left 5/5, right 5/5   Hip flexion left 5/5, right 5/5  Hip abduction left 5/5, right 5/5  Hip adduction left 5/5, right 5/5    --NEUROLOGIC:  Sensation to light touch is intact in the bilateral L4, L5, and S1 dermatomes.    RESULTS:   Imaging: Lumbar spine imaging was reviewed today. The images were shown to the patient and the findings were explained using a spine model.    OSTEOPATHIC STRUCTURAL EXAM:  Thoracic: T5-6 was rotated left and side bent right  Lumbar: L4-5 rotated right and left  Pelvis: Superior right pelvic shear  Sacrum: Rotated right    OMT was performed today.  The patient tolerated the procedure well.  Patient was instructed to drink plenty of water.  As discussed that the patient could have increased soreness after the treatment, soreness like after a workout.    OMT:  TREATMENTS IN PARENTHESES  Thoracic: Muscle energy  Lumbar: Articulatory  Pelvis: Muscle energy  Sacrum: Muscle energy

## 2021-06-15 NOTE — PATIENT INSTRUCTIONS - HE
Osteopathic manipulation is performed today.  You could feel a sense of soreness like a workout type soreness.  Recommend he drink plenty water today.    ~Please call Nurse Navigation line [phone] with any questions or concerns about your treatment plan, if symptoms worsen and you would like to be seen urgently, or if you have problems controlling bladder and bowel function.

## 2021-06-16 NOTE — TELEPHONE ENCOUNTER
Multiple phone calls to patient to let him know we had to cancel his appointment on 3/31/21 because we have to get authorization for his RFA first. Cannot leave any messages because his voicemail box is full.

## 2021-06-16 NOTE — PATIENT INSTRUCTIONS - HE
DISCHARGE INSTRUCTIONS    During office hours (8:00 a.m.- 4:00 p.m.) questions or concerns may be answered  by calling [facility] Navigation Nurses at  [phone].  Messages received after hours will be returned the following business day.      In the case of an emergency, please dial 911 or seek assistance at the nearest Emergency Room/Urgent Care facility.     All Patients:  ? You may experience an increase in your symptoms for the first 2 days, once the numbing medication wears off.    ? You may resume your regular medication, no pain medication until you have completed your diary    ? You may shower. No swimming, tub bath or hot tub for 24 hours; remove bandage after 4 hours    ? Continue your activities that can cause you pain to test the blocks.                         ? You should not drive for the next 3 to 5 hours (have someone drive you)           POSSIBLE PROCEDURE SIDE EFFECTS  -Call [facility] if concerned-    Increased Pain  Increased numbness/tingling     Nausea/Vomiting  Bruising/bleeding at site (hematoma)             Swelling at site (edema) Headache  Difficulty walking  Infection        Fever greater than 100.5

## 2021-06-16 NOTE — TELEPHONE ENCOUNTER
Patient called back and explained that his appointment on 3/31/21 had to be cancelled. The RFA that was ordered needs to get authorization first and then we will call him to set up an appt if authorized. Patient stated understanding.

## 2021-06-16 NOTE — TELEPHONE ENCOUNTER
Multiple phone call to patient to let him know we had to cancel his upcoming appointment for RFA because we have to get authorization first. We will then call the patient to schedule him. Could not leave any message due to mailbox being full. Will keep trying to call.

## 2021-06-16 NOTE — PROGRESS NOTES
Assessment:     Diagnoses and all orders for this visit:    Lumbosacral spondylosis without myelopathy  -     Cancel: OPS Paravertebral Facet Joint Inj Lbr Un; Future; Expected date: 03/31/2021  -     diazePAM (VALIUM) 5 MG tablet; 1 tablet po q 2 hour prior to procedure.  You can take the second one 1 hour prior to the procedure if needed.  Dispense: 2 tablet; Refill: 0  -     OPS Paravertrbral Facet joint DestructiU; Future; Expected date: 03/31/2021    Myofascial pain  -     Cancel: OPS Paravertebral Facet Joint Inj Lbr Un; Future; Expected date: 03/31/2021    Somatic dysfunction of lumbar region    Somatic dysfunction of pelvic region    Somatic dysfunction of sacral region       Jose Manuel ARCHIBALD Beatrice Morgan is a 55 y.o. y.o. male with past medical history significant for tobacco dependence, erectile dysfunction, left hip replacement 10/2018  who presents today for follow-up regarding right-sided low back pain:    -Patient continues to have right-sided low back pain.  This is likely secondary to lumbar spondylosis without myelopathy.  He did not receive much benefit from the treatment last time he felt the same after the treatment today.  1 did have discussions about possibly repeating the radiofrequency ablation that was done in the past.  He had 90% relief for 9 months and has slowly had return of pain until it is now back to baseline.     Plan:     A shared decision making plan was used. The patient's values and choices were respected. Prior medical records from our last visit on 3/10/2021 were reviewed today. The following represents what was discussed and decided upon by the provider and the patient.        -DIAGNOSTIC TESTS: Images were personally reviewed and interpreted.   --Lumbar spine MRI 7/1/2019 shows posterior disc bulge at L3-4 and L4-5 with minimal bilateral foraminal stenosis.  Disc bulge at L5-S1 with no foraminal stenosis.  Disc height loss most advanced at L4-5.  Right convex curvature  thoracolumbar junction.  Multilevel facet hypertrophy with arthritis most advanced on the right at L5-S1.    -INTERVENTIONS: Osteopathic manipulation is performed today.  Please see the procedure note below.  I also ordered repeat right L4 and L5 medial branch radiofrequency ablation.  I have also ordered Valium for him and had him sign a consent form.    -MEDICATIONS: No changes to medications.  -  Discussed side effects of medications and proper use. Patient verbalized understanding.    -PHYSICAL THERAPY: He will continue with doing physical therapy exercises at home on a daily basis.  Discussed the importance of core strengthening, ROM, stretching exercises with the patient and how each of these entities is important in decreasing pain.  Explained to the patient that the purpose of physical therapy is to teach the patient a home exercise program.  These exercises need to be performed every day in order to decrease pain and prevent future occurrences of pain.        -PATIENT EDUCATION: We discussed pain management in a multimodal fashion including physical therapy, medication management, future injections.    -FOLLOW UP: Patient will follow up with Angela 4 weeks after the procedure.  Advised to contact clinic if symptoms worsen or change.    Subjective:     Jose Manuel Barron Jr. is a 55 y.o. male who presents today for follow-up regarding right-sided low back pain.  Patient reports that he received very little benefit from osteopathic manipulation the first time, would like to try it again.  He notes that his pain is worse when he is actively doing things such as lifting and bending and improved with doing nothing and relaxing.  His pain today is 3/10 is worst is 5/10 is best is 1/10.  He notes that he received 90% relief with the radiofrequency ablation in March 2020 which lasted about 9 months and progressively worsened until back to where he was prior to the ablation.  He notes that it was very helpful and  this has been the only thing that has been helpful for his back pain.  He notes that he has tried physical therapy and after the treatment of osteopathic manipulation today wonders if we could try this again.  He is currently not taking any medications for pain.  He denies any bowel or bladder changes, fevers, chills, unintentional weight loss.    -Treatment to Date: No prior spinal surgery or spinal injection.  Physical therapy [facility] for neck and back pain x32 sessions 3/3/2018 through 12/26/2018 with minimal benefit.  Physical therapy x4 sessions optimum 1/29/2020 chronic right LBP.     Right L5-S1 facet joint steroid injection 8/2/2019 with some initial, minimal lasting benefit.  Preprocedure pain 3/10, post 1/10.  Right SI joint steroid injection 11/22/2019 with relief x2 days, 10% lasting relief.  Right L4, L5 MBB 12/20/2019 and 2/7/2020.  Right L4, L5 radiofrequency ablation 3/4/2020 with 90% relief chronic right LBP, relief x8 months.  Preprocedure pain 3/10, post 0/10.    2 sessions of osteopathic manipulation.    There is no problem list on file for this patient.      Current Outpatient Medications on File Prior to Encounter   Medication Sig Dispense Refill     multivitamin with minerals (THERA-M) 9 mg iron-400 mcg Tab tablet Take 1 tablet by mouth daily.       tadalafil (CIALIS) 5 MG tablet Take 5 mg by mouth daily as needed for erectile dysfunction.       meloxicam (MOBIC) 7.5 MG tablet Take 1 tablet (7.5 mg total) by mouth 2 (two) times a day as needed. 30 tablet 2     No current facility-administered medications on file prior to encounter.        No Known Allergies    No past medical history on file.     Review of Systems  ROS:  Specifically negative for bowel/bladder dysfunction, balance changes, headache, dizziness, foot drop, fevers, chills, appetite changes, nausea/vomiting, unexplained weight loss. Otherwise 13 systems reviewed are negative. Please see the patient's intake questionnaire from today  for details.    Reviewed Social, Family, Past Medical and Past Surgical history with patient, no significant changes noted since prior visit.     Objective:     /68 (Patient Site: Right Arm, Patient Position: Sitting, Cuff Size: Adult Regular)   Pulse 89   Temp 97.9  F (36.6  C) (Oral)   SpO2 97%     PHYSICAL EXAMINATION:    --CONSTITUTIONAL: Well developed, well nourished, healthy appearing individual.  --PSYCHIATRIC: Appropriate mood and affect. No difficulty interacting due to temper, social withdrawal, or memory issues.  --SKIN: Lumbar region is dry and intact.   --RESPIRATORY: Normal rhythm and effort. No abnormal accessory muscle breathing patterns noted.   --MUSCULOSKELETAL:  Normal lumbar lordosis noted, no lateral shift.  --GROSS MOTOR: Easily arises from a seated position. Gait is non-antalgic  --LUMBAR SPINE:  Inspection reveals no evidence of deformity. Range of motion is mildly limited in lumbar flexion, extension, lateral rotation.  Tenderness palpation of the right low back area..  --SACROILIAC JOINT: One Finger point test negative.  --LOWER EXTREMITY MOTOR TESTING:  Plantar flexion left 5/5, right 5/5   Dorsiflexion left 5/5, right 5/5   Great toe MTP extension left 5/5, right 5/5  Knee flexion left 5/5, right 5/5  Knee extension left 5/5, right 5/5   Hip flexion left 5/5, right 5/5  Hip abduction left 5/5, right 5/5  Hip adduction left 5/5, right 5/5   --HIPS: Full range of motion bilaterally.   --NEUROLOGIC: Sensation to light touch is intact in the bilateral L4, L5, and S1 dermatomes.    RESULTS:   Imaging: Lumbar spine imaging was reviewed today. The images were shown to the patient and the findings were explained using a spine model.    OSTEOPATHIC STRUCTURAL EXAM:  Lumbar: L4-5 is rotated right side bent left  Pelvis: Superior right pelvic shear  Sacrum: Rotated right      OMT was performed today.  The patient tolerated the procedure well.  Patient was instructed to drink plenty of  water.  As discussed that the patient could have increased soreness after the treatment, soreness like after a workout.    OMT:  TREATMENTS IN PARENTHESES  Lumbar: Muscle energy  Pelvis: Muscle energy  Sacrum: Muscle energy

## 2021-06-16 NOTE — PATIENT INSTRUCTIONS - HE
Osteopathic manipulation is performed today.  You could feel a sense of soreness like a workout type soreness.  Please drink plenty water today.    I have ordered repeat radiofrequency ablation for you.  I ordered Valium for you.  You should take 1 to 2 hours before and possibly a second one 1 hour before the procedure.  We will call you to get this scheduled.    ~Please call Nurse Navigation line [phone] with any questions or concerns about your treatment plan, if symptoms worsen and you would like to be seen urgently, or if you have problems controlling bladder and bowel function.

## 2021-06-17 NOTE — PATIENT INSTRUCTIONS - HE
~Please call our [facility] Nurse Navigation line [phone] with any questions or concerns about your treatment plan, if symptoms worsen and you would like to be seen urgently, or if you have problems controlling bladder and bowel function.      Importance of specialized Physical Therapy: Discussed the importance of core strengthening, ROM, stretching exercises with the patient and how each of these entities is important in decreasing pain.  Explained to the patient that the purpose of physical therapy is to teach the patient a home exercise program individualized to them and their specific health concerns.  These exercises need to be performed every day in order to decrease pain and prevent future occurrences of pain.

## 2021-06-17 NOTE — PATIENT INSTRUCTIONS - HE
Patient Instructions by Leroy Gaffney, PT at 1/24/2020 10:00 AM     Author: Leroy Gaffney PT Service: -- Author Type: Physical Therapist    Filed: 1/24/2020 10:23 AM Encounter Date: 1/24/2020 Status: Signed    : Leroy Gaffney, PT (Physical Therapist)        PIRIFORMIS STRETCH    While lying on your back with both knee bent, cross your affected leg on the other knee.     Next, hold your unaffected thigh and pull it up towards your chest until a stretch is felt in the buttock.    Hold for 20-30 seconds  2X/day      LOWER TRUNK ROTATIONS - LTR    Lying on your back with your knees bent, gently move your knees side-to-side.    10X  2X/day

## 2021-06-17 NOTE — PATIENT INSTRUCTIONS - HE
"Patient Instructions by Zuleika Chamberlain PT at 1/22/2020  3:30 PM     Author: Zuleika Chamberlain PT Service: -- Author Type: Physical Therapist    Filed: 1/22/2020  4:19 PM Encounter Date: 1/22/2020 Status: Addendum    : Zuleika Chamberlain PT (Physical Therapist)    Related Notes: Original Note by Zuelika Chamberlain PT (Physical Therapist) filed at 1/22/2020  4:07 PM        CAT AND COW    While on your hands and knees in a crawl position, raise up your back and arch it towards the ceiling like an angry cat.    Next return to a lowered position and arch your back the opposite direction.    Nice and slow move between the two, repeat about 10 times MORNING and NIGHT           SEATED HAMSTRING STRETCH    While seated, rest your heel on the floor with your knee straight and gently lean forward until a stretch is felt behind you knee/thigh.      Hold this stretch 45\" on each leg                   "

## 2021-06-17 NOTE — PATIENT INSTRUCTIONS - HE
"Patient Instructions by Leroy Gaffney PT at 1/27/2020 10:00 AM     Author: Leroy Gaffney PT Service: -- Author Type: Physical Therapist    Filed: 1/27/2020 10:24 AM Encounter Date: 1/27/2020 Status: Signed    : Leroy Gaffney PT (Physical Therapist)        BRIDGING    While lying on your back, tighten your lower abdominals, squeeze your buttocks and then raise your buttocks off the floor/bed as creating a \"Bridge\" with your body.    X 10-20  1X/day      Table-Top with Marching    Begin in the table-top position with lower abdominal muscles engaged, low back flat against the ground (90 degrees at your hips and knees). Then alternate tapping your heels to the ground one at a time. Between each tap, return to the starting position before alternating to the opposite leg. Tapping the ground with each heel once is one repetition.    X 10-20  1X/day            "

## 2021-06-18 NOTE — PATIENT INSTRUCTIONS - HE
"Patient Instructions by Zuleika Chamberlain PT at 1/29/2020 11:30 AM     Author: Zuleika Chamberlain PT Service: -- Author Type: Physical Therapist    Filed: 1/29/2020 11:53 AM Encounter Date: 1/29/2020 Status: Signed    : Zuleika Chamberlain PT (Physical Therapist)        LOWER TRUNK ROTATIONS - LTR    Lying on your back with your knees bent, gently move your knees side-to-side.    Repeat 10-20x     Lay on your back with your knees bent up. Cross one foot over the opposite knee. To increase the stretch use your hands to push your free knee away from you.   Hold for 30 seconds, repeat on both sides.   BRIDGING    While lying on your back, tighten your lower abdominals, squeeze your buttocks and then raise your buttocks off the floor/bed as creating a \"Bridge\" with your body.    Repeat 10x, hold each for 10 seconds      BRACE - BICYCLE    While lying on your back with your knees bent,  raise up both feet and straighten one out in front of you. Then return the leg back and straighten the other. Use your stomach muscles to keep your spine from moving.    Repeat 3 sets, until you feel like you start to fatigue      CAT AND COW    While on your hands and knees in a crawl position, raise up your back and arch it towards the ceiling like an angry cat.    Next return to a lowered position and arch your back the opposite direction.    Repeat 10x nice and slow          JAZ POSE    Exhale to take your hips back toward your ankles, gently stretching the back.    Hold 30 seconds, breathing deeply further into the stretch.    Repeat 2-3 times.            "

## 2021-06-18 NOTE — PATIENT INSTRUCTIONS - HE
Patient Instructions by Angela Segovia CNP at 1/12/2021  2:40 PM     Author: Angela Segovia CNP Service: -- Author Type: Nurse Practitioner    Filed: 1/12/2021  3:09 PM Date of Service: 1/12/2021  2:40 PM Status: Addendum    : Angela Segovia CNP (Nurse Practitioner)    Related Notes: Original Note by Angela Segovia CNP (Nurse Practitioner) filed at 1/12/2021  3:05 PM       Prescribed Meloxicam today. Please take as prescribed, as needed for pain control as well as to aid in decreasing inflammation. Take medication with a full glass of water and food.   *Do not take Advil, Ibuprofen, Aleve, or Naproxen while taking this medication as it can cause organ failure if taken together*  This medication does have risks if taken long term, these risks include: gastrointestinal irritation, kidney dysfunction, and cardiovascular effects.      *Discussed the importance of core strengthening, ROM, stretching exercises with the patient and how each of these entities is important in decreasing pain.  Explained to the patient that the purpose of physical therapy is to teach the patient a home exercise program.  These exercises need to be performed every day in order to decrease pain and prevent future occurrences of pain.      *Start with one exercise per day and as tolerated add on another exercise every day. If one specific exercise increases your pain, please stop only that exercise and continue with the others that are not increasing your pain.   *Continue them daily ongoing even if your pain is improving to prevent future flare ups in your pain.     Back Exercises: Hip Rotator Stretch          To start, lie on your back with your knees bent and feet flat on the floor. Dont press your neck or lower back to the floor. Breathe deeply. You should feel comfortable and relaxed in this position.    Rest your right ankle on your left knee.    Place a towel behind your left thigh and use it to pull the  knee toward your chest. Feel the stretch in your buttocks.    Hold for 30-60 seconds. Release.    Repeat 2 times.    Switch legs.         Back Exercises: Leg Pull          To start, lie on your back with your knees bent and feet flat on the floor. Dont press your neck or lower back to the floor. Breathe deeply. You should feel comfortable and relaxed in this position.    Pull one knee to your chest.    Hold for 30-60 seconds. Return to starting position.    Repeat 2 times.    Switch legs.    For a double leg pull, pull both legs to your chest at the same time. Repeat 2 times.        Back Exercises: Pelvic Tilt  To start, lie on your back with your knees bent and feet flat on the floor. Dont press your neck or lower back to the floor. Breathe deeply. You should feel comfortable and relaxed in this position.    Tighten your abdomen and buttocks, and press your lower back toward the floor. This should be a small, subtle movement. This should not increase your pain.    Hold for 5 seconds. Release.    Repeat 5 times.               Back Exercises: Bridge  The Bridge exercise strengthens your abdominal, buttocks, and hamstring muscles. This helps keep your back stable and aligned when you walk.    Lie on the floor with your back and palms flat. Bend your knees. Keep your feet flat on the floor.    Contract your abdominal and buttocks muscles. Slowly lift your buttocks off the floor until there is a straight line from your knees to your shoulders.    Hold for 5  seconds. Repeat 10 times.          Back Exercises: Partial Curl-Ups          To start, lie on your back with your knees bent and feet flat on the floor. Dont press your neck or lower back to the floor. Breathe deeply. You should feel comfortable and relaxed in this position.    Cross your arms loosely.    Tighten your abdomen and curl [facility] up, keeping your head in line with your shoulders.    Hold for 5 seconds. Uncurl to lie down.    Repeat 5 times.         Back  Exercises: Leg Reach          Do this exercise on your hands and knees. Keep your knees under your hips and your hands under your shoulders. Keep your spine in a neutral position (not arched or sagging). Be sure to maintain your necks natural curve.    Extend one leg straight back. Dont arch your back or let your head or body sag.    Hold for 5 seconds. Return to starting position.    Repeat 5 times.    Switch legs.         Back Exercises: Lower Back Stretch                          To start, sit in a chair with your feet flat on the floor. Shift your weight slightly forward to avoid rounding your back. Relax, and keep your ears, shoulders, and hips aligned.    Sit with your feet well apart.    Bend forward and touch the floor with the backs of your hands. Relax and let your body drop.    Hold for 20 seconds. Return to starting position.    Repeat 2 times.         Back Exercises: Side Stretch      To start, sit in a chair with your feet flat on the floor. Shift your weight slightly forward to avoid rounding your back. Relax. Keep your ears, shoulders, and hips aligned.    Stretch your right arm overhead.    Slowly bend to the left. Dont twist your torso. Stay within your pain limits.    Hold for 20 seconds. Return to starting position.    Repeat 2 times. Then, switch to the other side.        Back Exercises: Lower Back Rotation  To start, lie on your back with your knees bent and feet flat on the floor. Dont press your neck or lower back to the floor. Breathe deeply. You should feel comfortable and relaxed in this position.    Drop both knees to one side. Turn your head to the other side. Keep your shoulders flat on the floor.    Do not push through pain.    Hold for 20 seconds.    Slowly switch sides.    Repeat 2 times.

## 2021-07-04 NOTE — ADDENDUM NOTE
Addendum Note by Yvette Gonzales CMA at 4/30/2021 12:40 PM     Author: Yvette Gonzales CMA Service: -- Author Type: Certified Medical Assistant    Filed: 4/30/2021  2:54 PM Date of Service: 4/30/2021 12:40 PM Status: Signed    : Yvette Gonzales CMA (Certified Medical Assistant)    Encounter addended by: Yvette Gonzales CMA on: 4/30/2021  2:54 PM      Actions taken: Vitals modified

## 2021-12-01 ENCOUNTER — TELEPHONE (OUTPATIENT)
Dept: PHYSICAL MEDICINE AND REHAB | Facility: CLINIC | Age: 56
End: 2021-12-01
Payer: COMMERCIAL

## 2021-12-01 DIAGNOSIS — M54.50 CHRONIC BILATERAL LOW BACK PAIN WITHOUT SCIATICA: Primary | ICD-10-CM

## 2021-12-01 DIAGNOSIS — G89.29 CHRONIC BILATERAL LOW BACK PAIN WITHOUT SCIATICA: Primary | ICD-10-CM

## 2021-12-01 DIAGNOSIS — M47.816 LUMBAR FACET ARTHROPATHY: ICD-10-CM

## 2021-12-01 NOTE — TELEPHONE ENCOUNTER
PSP:  Angela Segovia CNP   Last clinic visit: 4/30/21 OV; 4/5/21 Right L4, L5 RFA  Reason for call: Patient calling to report his low back pain has returned.   Clinical information:  He is hoping to just return for the procedure with Dr. Fallon and not have to see PSP. Reports he had 90-95% relief with last RFA. Pain returned about 1 and 1/2 months ago and had progressively worsening as time goes on. States pain is the same as before. In low back mainly right sided. Does note it goes into the right buttock a bit.   Advice given to patient: PSP will be updated. He will be called back with how she would like to proceed; either follow up or straight to injections.  Provider to address: Please advise
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
## 2021-12-03 NOTE — TELEPHONE ENCOUNTER
Pt returned call. Notified him of this. He stated understanding.     Informed pt that we will wait for a response from his insurance at this time. Once we have heard back he will then be contacted.

## 2021-12-17 ENCOUNTER — ANCILLARY PROCEDURE (OUTPATIENT)
Dept: PHYSICAL MEDICINE AND REHAB | Facility: CLINIC | Age: 56
End: 2021-12-17
Attending: NURSE PRACTITIONER
Payer: COMMERCIAL

## 2021-12-17 VITALS
OXYGEN SATURATION: 97 % | SYSTOLIC BLOOD PRESSURE: 144 MMHG | DIASTOLIC BLOOD PRESSURE: 88 MMHG | TEMPERATURE: 97.9 F | HEART RATE: 88 BPM | RESPIRATION RATE: 16 BRPM

## 2021-12-17 DIAGNOSIS — G89.29 CHRONIC BILATERAL LOW BACK PAIN WITHOUT SCIATICA: ICD-10-CM

## 2021-12-17 DIAGNOSIS — M54.50 CHRONIC BILATERAL LOW BACK PAIN WITHOUT SCIATICA: ICD-10-CM

## 2021-12-17 DIAGNOSIS — M47.816 LUMBAR FACET ARTHROPATHY: ICD-10-CM

## 2021-12-17 PROCEDURE — 64635 DESTROY LUMB/SAC FACET JNT: CPT | Mod: RT | Performed by: PAIN MEDICINE

## 2021-12-17 RX ORDER — LIDOCAINE HYDROCHLORIDE 10 MG/ML
INJECTION, SOLUTION EPIDURAL; INFILTRATION; INTRACAUDAL; PERINEURAL
Status: COMPLETED | OUTPATIENT
Start: 2021-12-17 | End: 2021-12-17

## 2021-12-17 RX ORDER — LIDOCAINE HYDROCHLORIDE 20 MG/ML
INJECTION, SOLUTION EPIDURAL; INFILTRATION; INTRACAUDAL; PERINEURAL
Status: COMPLETED | OUTPATIENT
Start: 2021-12-17 | End: 2021-12-17

## 2021-12-17 RX ORDER — BUPIVACAINE HYDROCHLORIDE 2.5 MG/ML
INJECTION, SOLUTION EPIDURAL; INFILTRATION; INTRACAUDAL
Status: COMPLETED | OUTPATIENT
Start: 2021-12-17 | End: 2021-12-17

## 2021-12-17 RX ADMIN — LIDOCAINE HYDROCHLORIDE 4 ML: 10 INJECTION, SOLUTION EPIDURAL; INFILTRATION; INTRACAUDAL; PERINEURAL at 14:54

## 2021-12-17 RX ADMIN — BUPIVACAINE HYDROCHLORIDE 4 ML: 2.5 INJECTION, SOLUTION EPIDURAL; INFILTRATION; INTRACAUDAL at 14:55

## 2021-12-17 RX ADMIN — LIDOCAINE HYDROCHLORIDE 6 ML: 20 INJECTION, SOLUTION EPIDURAL; INFILTRATION; INTRACAUDAL; PERINEURAL at 14:55

## 2021-12-17 ASSESSMENT — PAIN SCALES - GENERAL
PAINLEVEL: MILD PAIN (3)
PAINLEVEL: MILD PAIN (3)

## 2021-12-17 NOTE — PATIENT INSTRUCTIONS
Follow-up visit with Angela Segovia CNP in 4 weeks to discuss procedure outcome and determine care plan going forward.      DISCHARGE INSTRUCTIONS    During office hours (8:00 a.m.-4:00 p.m.) questions or concerns may be answered  by calling [facility] Navigation Nurses at  [phone].  Messages received after hours will be returned the following business day.      In the case of an emergency,please dial 911 or seek assistance at the nearest Emergency Room/Urgent Care facility.     All Patients:  ? You may experience an increase in your symptoms forthe first 5-7 days. Soreness may persist during the first few weeks as it takes 4-6 weeks for the nerves to fully heal.    ? You may use ice on the injection site,as frequently as 20 minutes each hour if needed. It is recommended NOT to apply heat during the first 24 hours.    ? You may take your pain medicine.    ? You may continue taking your regular medication.    ? You may shower. No swimming, tub bath or hot tub for 48hours. This also includes no lotions, ointments or patches to the needle sites as well. You may remove your bandaid/bandage as soon as you are home.    ? You mayresume light activities, as tolerated.    ? Resume your usual diet as tolerated.    ? It is strongly advisedthat you do not drive for 1-3 hours post injection.    ? If you have had oral sedation:  Do not drive for 8 hours post injection.             POSSIBLE PROCEDURE SIDE EFFECTS    -Call the [facility] if you are concerned      IncreasedPain             Increased numbness/tingling        Nausea/Vomiting            Bruising/bleeding at site        Redness or swelling    Difficulty walking        Weakness            Fever greater than 100.5

## 2021-12-17 NOTE — LETTER
12/17/2021         RE: Jose Manuel ARCHIBALD Beatrice Morgan  [address]  [facility] [address]        Dear Colleague,    Thank you for referring your patient, Jose Manuel Villalobosalexsweta Pillo, to the [facility] [place]. Please see a copy of my visit note below.    Patient was here for an injection today.        Again, thank you for allowing me to participate in the care of your patient.        Sincerely,        Carlos Enrique Fallon, DO

## 2022-07-27 ENCOUNTER — TELEPHONE (OUTPATIENT)
Dept: PHYSICAL MEDICINE AND REHAB | Facility: CLINIC | Age: 57
End: 2022-07-27

## 2022-07-27 NOTE — TELEPHONE ENCOUNTER
[facility] Call Center    Phone Message    May a detailed message be left on voicemail: yes     Reason for Call: Other: please call back      Action Taken: Message routed to:  [facility]): melodie    Travel Screening: Not Applicable       Patient would like an RF order for another injection sent to [facility] @ [phone]

## 2022-07-28 NOTE — TELEPHONE ENCOUNTER
Left  [phone] for pt to call me back.       Chart review:   --Last seen 4/30/21 by Angela Segovia  --Last injection 4/5/21 B/L L4, L[facility] Fallon, 90% relief post injection.    Patient will need to schedule a follow up with Angela Segovia to discuss about injection options. If pt would like to get injection at a outside facility ([facility]) then he would need to consult with Angela as well on this.

## 2022-07-28 NOTE — TELEPHONE ENCOUNTER
"[facility] Call Center    Phone Message    May a detailed message be left on voicemail: yes     Reason for Call: Other: Patient is returning call from Yvette Gareth. He stated that it's not an injection that he wanted, it's something that's call, \"radio frequency.\"   He had it done with Dr. Fallon in the past but this time he'd like to go to [facility] instead. Patient is wondering if can he get a referral or does he have to come back in. Please contact patient back.     Action Taken: Message routed to:  Other: [facility]    Travel Screening: Not Applicable                                                                       "

## 2022-07-29 NOTE — TELEPHONE ENCOUNTER
[phone]: I called and spoke with pt. Pt stated he got 90% relief on his last lumbar RF here. He C/O right lower back and right buttock pain worsening x 1 1/2 month. He would like a referral to [facility] to get another lumbar RF. He stated he does not want to get his RF done here and would like [facility].  I asked why he doesn't want to get RF here as he did get good relief with us. He doesn't want to go through PT and MBB x 2 again prior to the RF so that is why he wanted to go to [facility] per pt. Informed pt Angela is out the office today.     Informed pt he could check with his insurance to see if he needs a referral to see [facility]. He might be able to do self referral and consult with [facility] for RF if he want to look into his insurance plan. Otherwise, he can F/U with Angela first and discuss about referral to [facility] on the RF. We have not seen him x 1 year so he would need to schedule a follow up with Angela. Patient was ok scheduling a F/U with Angela but he want to make sure he can get a external referral to get the RF at [facility] so he don't waste his time coming in for a follow up. Informed pt I will talk to Angela on Monday and call him back. Pt verbally understood.

## 2022-08-01 DIAGNOSIS — M47.816 LUMBAR FACET ARTHROPATHY: ICD-10-CM

## 2022-08-01 DIAGNOSIS — M54.50 CHRONIC BILATERAL LOW BACK PAIN WITHOUT SCIATICA: Primary | ICD-10-CM

## 2022-08-01 DIAGNOSIS — G89.29 CHRONIC BILATERAL LOW BACK PAIN WITHOUT SCIATICA: Primary | ICD-10-CM

## 2022-08-01 NOTE — TELEPHONE ENCOUNTER
Phone call to patient to discuss his pain; is it bilateral or unilateral. Left message to return call.     Per Angela Segovia, CNP: We do not have to repeat the PT or MBB for a repeat RF. I placed the order for repeat RF since he got 90% relief for over a year.   The only thing we need to ask is if his pain is bilateral at this time.  I put the order in for a right RF because that is the last procedure he had done however prior to that he did have a bilateral L4, L5 RF.

## 2022-08-09 ENCOUNTER — TELEPHONE (OUTPATIENT)
Dept: PHYSICAL MEDICINE AND REHAB | Facility: CLINIC | Age: 57
End: 2022-08-09

## 2022-08-09 NOTE — TELEPHONE ENCOUNTER
[facility] Call Center    Phone Message    May a detailed message be left on voicemail: yes     Reason for Call: Other: Pt is requesting to have the referral for the RF?  sent to [facility] fax# [phone], Pt would like a call back regarding this      Action Taken: Other: [facility] [facility]    Travel Screening: Not Applicable

## 2022-08-09 NOTE — TELEPHONE ENCOUNTER
Medical records (office notes, radiology report, all spine procedure notes, pain dairy) from 6/2019 to present fax to [facility] along with order for radiofrequency ablation. MRI lumbar from 2019 request to be pushed to [facility] or mail to [facility] from [facility].

## 2022-08-09 NOTE — TELEPHONE ENCOUNTER
"I called and spoke with patient. Informed patient about message below. However, pt would still like to get RF done at [facility]. Informed pt I will send RF referral along with our records to [facility]. Pt asked when he can get in with [facility]. Informed pt I don't know. Once they get his record/referral for the RF then he will contact him to schedule. To call [facility] after a couple days if he doesn't hear from them. Pt verbally understood.     Informed Angela that pt declined to get RF with us and would like to go to [facility] for RF. Also confirmed with Angela is symptom is on the right side only which is correct for the RF order.       \"Per Angela Segovia, CNP: We do not have to repeat the PT or MBB for a repeat RF. I placed the order for repeat RF since he got 90% relief for over a year.   The only thing we need to ask is if his pain is bilateral at this time.  I put the order in for a right RF because that is the last procedure he had done however prior to that he did have a bilateral L4, L5 RF.\"  "

## 2022-09-08 ENCOUNTER — OFFICE VISIT (OUTPATIENT)
Dept: FAMILY MEDICINE | Facility: CLINIC | Age: 57
End: 2022-09-08
Payer: COMMERCIAL

## 2022-09-08 VITALS
DIASTOLIC BLOOD PRESSURE: 68 MMHG | OXYGEN SATURATION: 95 % | RESPIRATION RATE: 18 BRPM | SYSTOLIC BLOOD PRESSURE: 109 MMHG | HEART RATE: 87 BPM

## 2022-09-08 DIAGNOSIS — R52 BODY ACHES: Primary | ICD-10-CM

## 2022-09-08 LAB
ERYTHROCYTE [DISTWIDTH] IN BLOOD BY AUTOMATED COUNT: 12.9 % (ref 10–15)
HCT VFR BLD AUTO: 42.6 % (ref 40–53)
HGB BLD-MCNC: 14.8 G/DL (ref 13.3–17.7)
MCH RBC QN AUTO: 33 PG (ref 26.5–33)
MCHC RBC AUTO-ENTMCNC: 34.7 G/DL (ref 31.5–36.5)
MCV RBC AUTO: 95 FL (ref 78–100)
PLATELET # BLD AUTO: 218 10E3/UL (ref 150–450)
RBC # BLD AUTO: 4.48 10E6/UL (ref 4.4–5.9)
WBC # BLD AUTO: 5.3 10E3/UL (ref 4–11)

## 2022-09-08 PROCEDURE — 85027 COMPLETE CBC AUTOMATED: CPT | Performed by: NURSE PRACTITIONER

## 2022-09-08 PROCEDURE — 80053 COMPREHEN METABOLIC PANEL: CPT | Performed by: NURSE PRACTITIONER

## 2022-09-08 PROCEDURE — 99213 OFFICE O/P EST LOW 20 MIN: CPT | Performed by: NURSE PRACTITIONER

## 2022-09-08 PROCEDURE — 83735 ASSAY OF MAGNESIUM: CPT | Performed by: NURSE PRACTITIONER

## 2022-09-08 PROCEDURE — 36415 COLL VENOUS BLD VENIPUNCTURE: CPT | Performed by: NURSE PRACTITIONER

## 2022-09-08 NOTE — PROGRESS NOTES
"c  Assessment & Plan     Body aches  No acute distress.  With body aches and fatigue recommend COVID-19 testing, Jose Manuel declined testing in clinic but will do home testing.  We will do labs to assess for other causes of symptoms.  Recommend Tylenol or ibuprofen as needed for body aches.  Symptomatic care and follow up discussed.  - Comprehensive metabolic panel (BMP + Alb, Alk Phos, ALT, AST, Total. Bili, TP); Future  - CBC with platelets; Future  - Magnesium; Future    Return in about 1 week (around 9/15/2022), or if symptoms worsen or fail to improve.    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      Objective    /68 (BP Location: Right arm, Patient Position: Sitting, Cuff Size: Adult Regular)   Pulse 87   Resp 18   SpO2 95%   There is no height or weight on file to calculate BMI.  Physical Exam   GENERAL: healthy, alert and no distress  NECK: no adenopathy  RESP: lungs clear to auscultation - no rales, rhonchi or wheezes  CV: regular rate and rhythm, normal S1 S2, no S3 or S4, no murmur  MS: no gross musculoskeletal defects noted, no edema  PSYCH: mentation appears normal, affect normal/bright              "

## 2022-09-09 ENCOUNTER — TELEPHONE (OUTPATIENT)
Dept: FAMILY MEDICINE | Facility: CLINIC | Age: 57
End: 2022-09-09

## 2022-09-09 LAB
ALBUMIN SERPL BCG-MCNC: 4.4 G/DL (ref 3.5–5.2)
ALP SERPL-CCNC: 55 U/L (ref 40–129)
ALT SERPL W P-5'-P-CCNC: 21 U/L (ref 10–50)
ANION GAP SERPL CALCULATED.3IONS-SCNC: 13 MMOL/L (ref 7–15)
AST SERPL W P-5'-P-CCNC: 33 U/L (ref 10–50)
BILIRUB SERPL-MCNC: 0.2 MG/DL
BUN SERPL-MCNC: 14.6 MG/DL (ref 8–23)
CALCIUM SERPL-MCNC: 8.8 MG/DL (ref 8.6–10)
CHLORIDE SERPL-SCNC: 98 MMOL/L (ref 98–107)
CREAT SERPL-MCNC: 0.8 MG/DL (ref 0.67–1.17)
DEPRECATED HCO3 PLAS-SCNC: 22 MMOL/L (ref 22–29)
GFR SERPL CREATININE-BSD FRML MDRD: >90 ML/MIN/1.73M2
GLUCOSE SERPL-MCNC: 88 MG/DL (ref 70–99)
MAGNESIUM SERPL-MCNC: 2.1 MG/DL (ref 1.7–2.3)
POTASSIUM SERPL-SCNC: 3.8 MMOL/L (ref 3.4–5.3)
PROT SERPL-MCNC: 7.4 G/DL (ref 6.4–8.3)
SODIUM SERPL-SCNC: 133 MMOL/L (ref 136–145)

## 2022-09-09 NOTE — TELEPHONE ENCOUNTER
Called patient and relayed message. Patient understood and will follow up if symptoms persists. No questions.    Marcos Tracey MA on 9/9/2022 at 9:53 AM

## 2022-09-09 NOTE — TELEPHONE ENCOUNTER
----- Message from More Thomas PA-C sent at 9/9/2022  6:54 AM CDT -----  Team - please call patient with results.  Labs are acceptable, nothing to explain his muscle aches.  Sodium is mildly low which can be from too much free water, please make sure to get in electrolyte solution such as Gatorade or Pedialyte, broth.  Follow-up with primary care if not improved

## 2022-12-10 ENCOUNTER — OFFICE VISIT (OUTPATIENT)
Dept: FAMILY MEDICINE | Facility: CLINIC | Age: 57
End: 2022-12-10
Payer: COMMERCIAL

## 2022-12-10 VITALS
HEART RATE: 95 BPM | WEIGHT: 155.8 LBS | SYSTOLIC BLOOD PRESSURE: 144 MMHG | TEMPERATURE: 99.3 F | DIASTOLIC BLOOD PRESSURE: 85 MMHG | OXYGEN SATURATION: 95 %

## 2022-12-10 DIAGNOSIS — R68.89 FLU-LIKE SYMPTOMS: Primary | ICD-10-CM

## 2022-12-10 DIAGNOSIS — R05.1 ACUTE COUGH: ICD-10-CM

## 2022-12-10 LAB
FLUAV AG SPEC QL IA: NEGATIVE
FLUBV AG SPEC QL IA: NEGATIVE

## 2022-12-10 PROCEDURE — 99213 OFFICE O/P EST LOW 20 MIN: CPT | Performed by: PHYSICIAN ASSISTANT

## 2022-12-10 PROCEDURE — 87804 INFLUENZA ASSAY W/OPTIC: CPT | Performed by: PHYSICIAN ASSISTANT

## 2022-12-10 NOTE — PROGRESS NOTES
"Patient presents with:  URI: \"Sick\" for 5 days. Decreased appetite. Cough, fatigue.       Clinical Decision Making:  Influenza test was obtained and was negative.  No COVID-19 screening test was performed at home.   Symptomatic care was gone over. Expected course of resolution and indication for return was gone over and questions were answered to patient/parent's satisfaction before discharge.        ICD-10-CM    1. Flu-like symptoms  R68.89       2. Acute cough  R05.1 Influenza A & B Antigen - Clinic Collect          Patient Instructions     You are treated for the flu. You are contagious until your fever is gone for 24 hours. Maintain good hand hygiene, cover your cough, and limit contact to prevent spreading the illness. Symptoms typically last 1-2 weeks.    Symptom management:  - Drink plenty of fluids and allow for plenty of rest  - Use tylenol or ibuprofen every 4-6 hours for fever/discomfort    Reasons to be seen immediately for re-evaluation:  - Have trouble breathing or are short of breath  - Feel pain or pressure in your chest or belly  - Get suddenly dizzy  - Feel confused  - Have severe vomiting    If no symptom improvement in 1 week, follow-up with your primary care provider.        HPI:  Jose Manuel Barron . is a 57 year old male who presents today for a 5-day history of influenza-like symptoms.  Patient has had difficulty with loss of appetite sore throat dry nonproductive cough fatigue myalgias and arthralgias.  He has not had vomiting diarrhea shortness of breath pleuritic chest pain syncope or presyncope.  Patient has not tried treatment for this over-the-counter.     History obtained from chart review and the patient.    Problem List:  There are no relevant problems documented for this patient.      History reviewed. No pertinent past medical history.    Social History     Tobacco Use     Smoking status: Every Day     Smokeless tobacco: Never   Substance Use Topics     Alcohol use: Not on file "       Review of Systems  As above in HPI otherwise negative.    Vitals:    12/10/22 1237   BP: (!) 144/85   BP Location: Right arm   Patient Position: Sitting   Cuff Size: Adult Regular   Pulse: 95   Temp: 99.3  F (37.4  C)   TempSrc: Tympanic   SpO2: 95%   Weight: 70.7 kg (155 lb 12.8 oz)       General: Patient is resting comfortably no acute distress is afebrile  HEENT: Head is normocephalic atraumatic   eyes are PERRL EOMI sclera anicteric   TMs are clear bilaterally  Throat is clear and no exudate  No cervical lymphadenopathy present  LUNGS: Clear to auscultation bilaterally normal respiratory effort and excursion.  HEART: Regular rate and rhythm  Skin: Without rash non-diaphoretic    Physical Exam      Labs:  Results for orders placed or performed in visit on 12/10/22   Influenza A & B Antigen - Clinic Collect     Status: Normal    Specimen: Nose; Swab   Result Value Ref Range    Influenza A antigen Negative Negative    Influenza B antigen Negative Negative    Narrative    Test results must be correlated with clinical data. If necessary, results should be confirmed by a molecular assay or viral culture.       At the end of the encounter, I discussed results, diagnosis, medications. Discussed red flags for immediate return to clinic/ER, as well as indications for follow up if no improvement. Patient understood and agreed to plan. Patient was stable for discharge.

## 2022-12-10 NOTE — PATIENT INSTRUCTIONS
You are treated for the flu. You are contagious until your fever is gone for 24 hours. Maintain good hand hygiene, cover your cough, and limit contact to prevent spreading the illness. Symptoms typically last 1-2 weeks.    Symptom management:  - Drink plenty of fluids and allow for plenty of rest  - Use tylenol or ibuprofen every 4-6 hours for fever/discomfort    Reasons to be seen immediately for re-evaluation:  - Have trouble breathing or are short of breath  - Feel pain or pressure in your chest or belly  - Get suddenly dizzy  - Feel confused  - Have severe vomiting    If no symptom improvement in 1 week, follow-up with your primary care provider.

## 2023-10-04 ENCOUNTER — LAB REQUISITION (OUTPATIENT)
Dept: LAB | Facility: CLINIC | Age: 58
End: 2023-10-04

## 2023-10-04 ENCOUNTER — TRANSFERRED RECORDS (OUTPATIENT)
Dept: HEALTH INFORMATION MANAGEMENT | Facility: CLINIC | Age: 58
End: 2023-10-04

## 2023-10-04 ENCOUNTER — TRANSFERRED RECORDS (OUTPATIENT)
Dept: HEALTH INFORMATION MANAGEMENT | Facility: CLINIC | Age: 58
End: 2023-10-04
Payer: COMMERCIAL

## 2023-10-04 DIAGNOSIS — R80.9 PROTEINURIA, UNSPECIFIED: ICD-10-CM

## 2023-10-04 DIAGNOSIS — F10.10 ALCOHOL ABUSE, UNCOMPLICATED: ICD-10-CM

## 2023-10-04 LAB
ALBUMIN (URINE) MG/SPEC: 80 MG/L
ALBUMIN/CREATININE RATIO: ABNORMAL MG/G
ALT SERPL-CCNC: 18 U/L (ref 0–70)
AST SERPL-CCNC: 22 U/L (ref 0–45)
BASO+EOS+MONOS # BLD AUTO: ABNORMAL 10*3/UL
BASO+EOS+MONOS NFR BLD AUTO: ABNORMAL %
BASOPHILS # BLD AUTO: 0.1 10E3/UL (ref 0–0.2)
BASOPHILS NFR BLD AUTO: 1 %
CREATININE (EXTERNAL): 0.7 MG/DL (ref 0.67–1.17)
CREATININE (URINE): 200 MG/DL (ref 10–300)
EOSINOPHIL # BLD AUTO: 0.1 10E3/UL (ref 0–0.7)
EOSINOPHIL NFR BLD AUTO: 1 %
ERYTHROCYTE [DISTWIDTH] IN BLOOD BY AUTOMATED COUNT: 13.1 % (ref 10–15)
GFR ESTIMATED (EXTERNAL): >90 ML/MIN/1.73M2
GLUCOSE (EXTERNAL): 89 MG/DL (ref 70–99)
HCT VFR BLD AUTO: 46.8 % (ref 40–53)
HGB BLD-MCNC: 16.1 G/DL (ref 13.3–17.7)
IMM GRANULOCYTES # BLD: 0 10E3/UL
IMM GRANULOCYTES NFR BLD: 1 %
LYMPHOCYTES # BLD AUTO: 1.2 10E3/UL (ref 0.8–5.3)
LYMPHOCYTES NFR BLD AUTO: 17 %
MCH RBC QN AUTO: 33.7 PG (ref 26.5–33)
MCHC RBC AUTO-ENTMCNC: 34.4 G/DL (ref 31.5–36.5)
MCV RBC AUTO: 98 FL (ref 78–100)
MONOCYTES # BLD AUTO: 0.6 10E3/UL (ref 0–1.3)
MONOCYTES NFR BLD AUTO: 9 %
NEUTROPHILS # BLD AUTO: 5.2 10E3/UL (ref 1.6–8.3)
NEUTROPHILS NFR BLD AUTO: 71 %
NRBC # BLD AUTO: 0 10E3/UL
NRBC BLD AUTO-RTO: 0 /100
PLATELET # BLD AUTO: 248 10E3/UL (ref 150–450)
POTASSIUM (EXTERNAL): 4.1 MMOL/L (ref 3.4–5.3)
RBC # BLD AUTO: 4.78 10E6/UL (ref 4.4–5.9)
WBC # BLD AUTO: 7.3 10E3/UL (ref 4–11)

## 2023-10-04 PROCEDURE — 80053 COMPREHEN METABOLIC PANEL: CPT | Performed by: PHYSICIAN ASSISTANT

## 2023-10-04 PROCEDURE — 82570 ASSAY OF URINE CREATININE: CPT | Performed by: PHYSICIAN ASSISTANT

## 2023-10-04 PROCEDURE — 85025 COMPLETE CBC W/AUTO DIFF WBC: CPT | Performed by: PHYSICIAN ASSISTANT

## 2023-10-05 LAB
ALBUMIN SERPL BCG-MCNC: 4.6 G/DL (ref 3.5–5.2)
ALP SERPL-CCNC: 58 U/L (ref 40–129)
ALT SERPL W P-5'-P-CCNC: 18 U/L (ref 0–70)
ANION GAP SERPL CALCULATED.3IONS-SCNC: 15 MMOL/L (ref 7–15)
AST SERPL W P-5'-P-CCNC: 22 U/L (ref 0–45)
BILIRUB SERPL-MCNC: 0.4 MG/DL
BUN SERPL-MCNC: 13.4 MG/DL (ref 8–23)
CALCIUM SERPL-MCNC: 9.1 MG/DL (ref 8.8–10.2)
CHLORIDE SERPL-SCNC: 101 MMOL/L (ref 98–107)
CREAT SERPL-MCNC: 0.7 MG/DL (ref 0.67–1.17)
CREAT UR-MCNC: 138 MG/DL
DEPRECATED HCO3 PLAS-SCNC: 22 MMOL/L (ref 22–29)
EGFRCR SERPLBLD CKD-EPI 2021: >90 ML/MIN/1.73M2
GLUCOSE SERPL-MCNC: 89 MG/DL (ref 70–99)
MICROALBUMIN UR-MCNC: 47 MG/L
MICROALBUMIN/CREAT UR: 34.06 MG/G CR (ref 0–17)
POTASSIUM SERPL-SCNC: 4.1 MMOL/L (ref 3.4–5.3)
PROT SERPL-MCNC: 7.5 G/DL (ref 6.4–8.3)
SODIUM SERPL-SCNC: 138 MMOL/L (ref 135–145)

## 2023-10-06 ENCOUNTER — TRANSCRIBE ORDERS (OUTPATIENT)
Dept: OTHER | Age: 58
End: 2023-10-06

## 2023-10-06 ENCOUNTER — MEDICAL CORRESPONDENCE (OUTPATIENT)
Dept: HEALTH INFORMATION MANAGEMENT | Facility: CLINIC | Age: 58
End: 2023-10-06
Payer: COMMERCIAL

## 2023-10-06 ENCOUNTER — MEDICAL CORRESPONDENCE (OUTPATIENT)
Dept: HEALTH INFORMATION MANAGEMENT | Facility: CLINIC | Age: 58
End: 2023-10-06

## 2023-10-06 DIAGNOSIS — R80.9 PROTEINURIA, UNSPECIFIED TYPE: Primary | ICD-10-CM
